# Patient Record
Sex: MALE | Race: WHITE | NOT HISPANIC OR LATINO | URBAN - NONMETROPOLITAN AREA
[De-identification: names, ages, dates, MRNs, and addresses within clinical notes are randomized per-mention and may not be internally consistent; named-entity substitution may affect disease eponyms.]

---

## 2024-03-25 ENCOUNTER — OFFICE (OUTPATIENT)
Dept: URBAN - NONMETROPOLITAN AREA CLINIC 5 | Facility: CLINIC | Age: 19
End: 2024-03-25

## 2024-03-25 VITALS
RESPIRATION RATE: 19 BRPM | HEART RATE: 59 BPM | HEIGHT: 70 IN | WEIGHT: 161 LBS | SYSTOLIC BLOOD PRESSURE: 118 MMHG | DIASTOLIC BLOOD PRESSURE: 76 MMHG

## 2024-03-25 DIAGNOSIS — R14.0 ABDOMINAL DISTENSION (GASEOUS): ICD-10-CM

## 2024-03-25 DIAGNOSIS — R10.84 GENERALIZED ABDOMINAL PAIN: ICD-10-CM

## 2024-03-25 DIAGNOSIS — R63.4 ABNORMAL WEIGHT LOSS: ICD-10-CM

## 2024-03-25 DIAGNOSIS — E80.6 OTHER DISORDERS OF BILIRUBIN METABOLISM: ICD-10-CM

## 2024-03-25 DIAGNOSIS — K21.9 GASTRO-ESOPHAGEAL REFLUX DISEASE WITHOUT ESOPHAGITIS: ICD-10-CM

## 2024-03-25 PROCEDURE — 99204 OFFICE O/P NEW MOD 45 MIN: CPT | Performed by: INTERNAL MEDICINE

## 2024-03-25 NOTE — SERVICEHPINOTES
Matt Combs   is a   19 yo  male  with a past medical history of anxiety and GERD who presents to establish care for nausea, heartburn, abdominal pain, bloating, and gas.  Patient reports for the last 2-3 months he has had significant nausea.  Over the last month he has developed significant worsening of his stomach pain, bloating, and cramps.  The abdominal pain occurs daily and can be all over his abdomen but typically occurs in a bandlike pattern across his mid abdomen.  He describes this as sharp pain rated 3-7/10, lasting from 5 minutes to 1.5 hours, it improves with not eating, is exacerbated with eating, and is associated with nausea.  He also reports a 14 lb weight loss.  He does continue to have heartburn 1-2 times weekly.  He is taking pantoprazole but he is not taking it daily prior to breakfast.  He denies any dysphagia, odynophagia, bright red blood per rectum, or melena.  He typically moves his bowels once daily with formed stools in the morning and denies any blood or mucus in the stools.  He denies previously undergoing an EGD or colonoscopy.  He denies any family history of inflammatory bowel disease, gastric cancer, or colon cancer.  He thought some of his symptoms may be related to stress as he is enrolled in pharmacy school but while home for spring break he continued with similar symptoms.  He denies smoking, EtOH use, marijuana use, or NSAID use.  Ultrasound last week did show scant gallbladder sludge but no evidence of cholecystitis.  Blood work revealed WBC 5.2, hemoglobin 17.6, platelets 225, sodium 140, potassium 4.2, chloride 102, CO2 32, BUN 13, creatinine 1.14, T bili 3.9, AST 12, ALT 7, alk-phos 78, total protein 6.8, and albumin 4.6.  YTE was positive.  TSH and free T4 were unremarkable.  ESR and CRP were within normal limits.  Patient is from [place], [place].

## 2024-03-25 NOTE — SERVICENOTES
Given patient's significant abdominal pain in the setting of weight loss and hyperbilirubinemia will plan for CT scan further assessment.  Given noncardiac chest pain, bloating, and nausea will plan for EGD with small bowel, gastric, and esophageal biopsies.  Patient recently had hyperbilirubinemia on blood work and will plan to fractionate this.  Depending on whether this is hyper direct or indirect bilirubinemia will perform further workup.  Counseled patient on measures for bloating as above.  Counseled to avoid dairy products assess for improvement.  Counseled on timing of his PPI as he is not taking it prior to breakfast.  Will plan to see back in clinic in 2 months or sooner if needed based on workup as above.

## 2024-04-16 ENCOUNTER — AMBULATORY SURGICAL CENTER (OUTPATIENT)
Dept: URBAN - NONMETROPOLITAN AREA SURGERY 4 | Facility: SURGERY | Age: 19
End: 2024-04-16

## 2024-04-16 ENCOUNTER — OFFICE (OUTPATIENT)
Dept: URBAN - METROPOLITAN AREA PATHOLOGY 16 | Facility: PATHOLOGY | Age: 19
End: 2024-04-16

## 2024-04-16 ENCOUNTER — AMBULATORY SURGICAL CENTER (OUTPATIENT)
Dept: URBAN - NONMETROPOLITAN AREA SURGERY 4 | Facility: SURGERY | Age: 19
End: 2024-04-16
Payer: COMMERCIAL

## 2024-04-16 VITALS
SYSTOLIC BLOOD PRESSURE: 100 MMHG | RESPIRATION RATE: 17 BRPM | OXYGEN SATURATION: 98 % | HEART RATE: 72 BPM | OXYGEN SATURATION: 100 % | HEART RATE: 63 BPM | SYSTOLIC BLOOD PRESSURE: 100 MMHG | HEART RATE: 72 BPM | DIASTOLIC BLOOD PRESSURE: 73 MMHG | DIASTOLIC BLOOD PRESSURE: 69 MMHG | HEIGHT: 70 IN | DIASTOLIC BLOOD PRESSURE: 57 MMHG | HEART RATE: 73 BPM | DIASTOLIC BLOOD PRESSURE: 73 MMHG | WEIGHT: 158 LBS | DIASTOLIC BLOOD PRESSURE: 73 MMHG | WEIGHT: 158 LBS | OXYGEN SATURATION: 100 % | OXYGEN SATURATION: 98 % | OXYGEN SATURATION: 94 % | OXYGEN SATURATION: 97 % | RESPIRATION RATE: 17 BRPM | RESPIRATION RATE: 13 BRPM | DIASTOLIC BLOOD PRESSURE: 63 MMHG | OXYGEN SATURATION: 95 % | TEMPERATURE: 97.9 F | SYSTOLIC BLOOD PRESSURE: 101 MMHG | SYSTOLIC BLOOD PRESSURE: 101 MMHG | TEMPERATURE: 98.7 F | TEMPERATURE: 98.7 F | OXYGEN SATURATION: 99 % | DIASTOLIC BLOOD PRESSURE: 88 MMHG | HEART RATE: 63 BPM | OXYGEN SATURATION: 95 % | DIASTOLIC BLOOD PRESSURE: 69 MMHG | SYSTOLIC BLOOD PRESSURE: 100 MMHG | RESPIRATION RATE: 10 BRPM | RESPIRATION RATE: 10 BRPM | OXYGEN SATURATION: 99 % | DIASTOLIC BLOOD PRESSURE: 59 MMHG | RESPIRATION RATE: 13 BRPM | HEART RATE: 73 BPM | SYSTOLIC BLOOD PRESSURE: 96 MMHG | HEART RATE: 74 BPM | RESPIRATION RATE: 16 BRPM | DIASTOLIC BLOOD PRESSURE: 69 MMHG | OXYGEN SATURATION: 97 % | HEART RATE: 73 BPM | HEART RATE: 81 BPM | DIASTOLIC BLOOD PRESSURE: 63 MMHG | HEIGHT: 70 IN | OXYGEN SATURATION: 99 % | DIASTOLIC BLOOD PRESSURE: 57 MMHG | HEART RATE: 89 BPM | HEART RATE: 71 BPM | OXYGEN SATURATION: 98 % | DIASTOLIC BLOOD PRESSURE: 57 MMHG | SYSTOLIC BLOOD PRESSURE: 108 MMHG | TEMPERATURE: 97.9 F | SYSTOLIC BLOOD PRESSURE: 119 MMHG | DIASTOLIC BLOOD PRESSURE: 59 MMHG | HEART RATE: 74 BPM | DIASTOLIC BLOOD PRESSURE: 88 MMHG | HEART RATE: 63 BPM | HEART RATE: 81 BPM | OXYGEN SATURATION: 95 % | HEART RATE: 74 BPM | RESPIRATION RATE: 17 BRPM | HEART RATE: 89 BPM | OXYGEN SATURATION: 100 % | RESPIRATION RATE: 16 BRPM | DIASTOLIC BLOOD PRESSURE: 63 MMHG | SYSTOLIC BLOOD PRESSURE: 101 MMHG | HEART RATE: 89 BPM | SYSTOLIC BLOOD PRESSURE: 115 MMHG | RESPIRATION RATE: 16 BRPM | SYSTOLIC BLOOD PRESSURE: 122 MMHG | SYSTOLIC BLOOD PRESSURE: 122 MMHG | HEIGHT: 70 IN | SYSTOLIC BLOOD PRESSURE: 115 MMHG | OXYGEN SATURATION: 94 % | DIASTOLIC BLOOD PRESSURE: 88 MMHG | OXYGEN SATURATION: 97 % | SYSTOLIC BLOOD PRESSURE: 115 MMHG | OXYGEN SATURATION: 94 % | RESPIRATION RATE: 13 BRPM | HEART RATE: 71 BPM | HEART RATE: 72 BPM | SYSTOLIC BLOOD PRESSURE: 119 MMHG | DIASTOLIC BLOOD PRESSURE: 78 MMHG | HEART RATE: 81 BPM | HEART RATE: 71 BPM | SYSTOLIC BLOOD PRESSURE: 96 MMHG | SYSTOLIC BLOOD PRESSURE: 96 MMHG | DIASTOLIC BLOOD PRESSURE: 78 MMHG | SYSTOLIC BLOOD PRESSURE: 108 MMHG | DIASTOLIC BLOOD PRESSURE: 59 MMHG | TEMPERATURE: 98.7 F | SYSTOLIC BLOOD PRESSURE: 108 MMHG | SYSTOLIC BLOOD PRESSURE: 122 MMHG | WEIGHT: 158 LBS | DIASTOLIC BLOOD PRESSURE: 78 MMHG | RESPIRATION RATE: 10 BRPM | SYSTOLIC BLOOD PRESSURE: 119 MMHG | TEMPERATURE: 97.9 F

## 2024-04-16 DIAGNOSIS — K29.50 UNSPECIFIED CHRONIC GASTRITIS WITHOUT BLEEDING: ICD-10-CM

## 2024-04-16 DIAGNOSIS — R63.4 ABNORMAL WEIGHT LOSS: ICD-10-CM

## 2024-04-16 DIAGNOSIS — K21.9 GASTRO-ESOPHAGEAL REFLUX DISEASE WITHOUT ESOPHAGITIS: ICD-10-CM

## 2024-04-16 DIAGNOSIS — R14.0 ABDOMINAL DISTENSION (GASEOUS): ICD-10-CM

## 2024-04-16 DIAGNOSIS — K31.89 OTHER DISEASES OF STOMACH AND DUODENUM: ICD-10-CM

## 2024-04-16 DIAGNOSIS — R10.9 UNSPECIFIED ABDOMINAL PAIN: ICD-10-CM

## 2024-04-16 DIAGNOSIS — K31.9 DISEASE OF STOMACH AND DUODENUM, UNSPECIFIED: ICD-10-CM

## 2024-04-16 PROCEDURE — 88342 IMHCHEM/IMCYTCHM 1ST ANTB: CPT | Performed by: STUDENT IN AN ORGANIZED HEALTH CARE EDUCATION/TRAINING PROGRAM

## 2024-04-16 PROCEDURE — 88305 TISSUE EXAM BY PATHOLOGIST: CPT | Performed by: STUDENT IN AN ORGANIZED HEALTH CARE EDUCATION/TRAINING PROGRAM

## 2024-04-16 PROCEDURE — 43239 EGD BIOPSY SINGLE/MULTIPLE: CPT | Performed by: INTERNAL MEDICINE

## 2024-04-16 PROCEDURE — 88313 SPECIAL STAINS GROUP 2: CPT | Performed by: STUDENT IN AN ORGANIZED HEALTH CARE EDUCATION/TRAINING PROGRAM

## 2024-04-18 LAB
GASTRO ONE PATHOLOGY: PDF REPORT: (no result)

## 2024-04-22 PROBLEM — K31.89 OTHER DISEASES OF STOMACH AND DUODENUM: Status: ACTIVE | Noted: 2024-04-16
